# Patient Record
Sex: MALE | Race: WHITE | NOT HISPANIC OR LATINO | Employment: OTHER
[De-identification: names, ages, dates, MRNs, and addresses within clinical notes are randomized per-mention and may not be internally consistent; named-entity substitution may affect disease eponyms.]

---

## 2021-05-05 ENCOUNTER — TRANSCRIBE ORDERS (OUTPATIENT)
Dept: OTHER | Age: 66
End: 2021-05-05

## 2021-05-05 ENCOUNTER — DOCUMENTATION ONLY (OUTPATIENT)
Dept: ONCOLOGY | Facility: CLINIC | Age: 66
End: 2021-05-05

## 2021-05-05 DIAGNOSIS — C15.5 MALIGNANT NEOPLASM OF LOWER THIRD OF ESOPHAGUS (H): Primary | ICD-10-CM

## 2021-05-05 NOTE — PROGRESS NOTES
RECORDS STATUS - ALL OTHER DIAGNOSIS      RECORDS RECEIVED FROM: [facility]   DATE RECEIVED:    NOTES STATUS DETAILS   OFFICE NOTE from referring provider [facility] Dr. Jones   OFFICE NOTE from medical oncologist     DISCHARGE SUMMARY from hospital [facility] 21   DISCHARGE REPORT from the ER     OPERATIVE REPORT [facility] 21: EGD   MEDICATION LIST [facility]   CLINICAL TRIAL TREATMENTS TO DATE     LABS     PATHOLOGY REPORTS [facility], Report in CE, Slides requested   [employer] 5/3/21   ANYTHING RELATED TO DIAGNOSIS Epic/CE 21   GENONOMIC TESTING     TYPE:     IMAGING (NEED IMAGES & REPORT)     CT SCANS PACS 5/3/21, 21: [facility]   MRI     MAMMO     ULTRASOUND     PET

## 2021-05-07 DIAGNOSIS — C15.5 MALIGNANT NEOPLASM OF LOWER THIRD OF ESOPHAGUS (H): Primary | ICD-10-CM

## 2021-05-12 LAB — COPATH REPORT: NORMAL

## 2021-05-12 PROCEDURE — 999N001032 HC STATISTIC REVIEW OUTSIDE SLIDES TC 88321: Performed by: STUDENT IN AN ORGANIZED HEALTH CARE EDUCATION/TRAINING PROGRAM

## 2021-05-12 PROCEDURE — 88321 CONSLTJ&REPRT SLD PREP ELSWR: CPT | Performed by: PATHOLOGY

## 2021-05-21 ENCOUNTER — TELEPHONE (OUTPATIENT)
Dept: SURGERY | Facility: CLINIC | Age: 66
End: 2021-05-21

## 2021-05-21 NOTE — TELEPHONE ENCOUNTER
"Called patient on his home phone and cell phone 2x. His cell phone rang and rang then the call ended and his home phone is not in service. Reaching out to see if he as had the PET done and if he can get PFT done at [facility] near him.     I also called [facility] and left a voicemail on what the plan is for treatment and if patient is still needing an appointment with Thoracic Surgery.     Found in CE on 5/19/21:  \"Ever is here with his daughter Janice for oncology hospital follow up. Dr. Ram explained need for PET scan to complete staging and radiation consult. He plans to treat Ever with chemoradiation with taxol carbo via peripheral iv. Ever currently doesn't have insurance and requests everything be delayed until June 1st when his insurance coverage starts. Notified referral coordinator with request to contact daughter Janice to reschedule radiation consult for June 1st or later. Ever prefers appointments to be on odd days in June as these are his \"short\" work days. Counseled Ever regarding daily radiation treatments M-F and weekly chemotherapy and potential side effects. Ever works 7 days per week and has no one to cover for him. He is trying to find someone who can help him as he goes through treatment, otherwise Janice mentioned selling the company. Encouraged Ever to call in with new insurance ID numbers if he has them so we can check on pre authorization. Provided [facility] Esophageal Cancer patient guidebook and contact information. Encouraged Ever and Janice to call with any care coordination needs.     Rosa Menjivar RN GI/Head and Neck Cancer Coordinator, [phone]\"     "

## 2021-06-30 ENCOUNTER — TELEPHONE (OUTPATIENT)
Dept: SURGERY | Facility: CLINIC | Age: 66
End: 2021-06-30

## 2021-06-30 DIAGNOSIS — C15.5 MALIGNANT NEOPLASM OF LOWER THIRD OF ESOPHAGUS (H): Primary | ICD-10-CM

## 2021-06-30 NOTE — TELEPHONE ENCOUNTER
Called patients daughter to let her know that Ever will need PFT and then an appointment with Thoracic Surgery. Patient is needing a covid order faxed and then [facility] will schedule the covid test and PFT. Left a voicemail with my call back information.

## 2021-07-02 ENCOUNTER — TELEPHONE (OUTPATIENT)
Dept: SURGERY | Facility: CLINIC | Age: 66
End: 2021-07-02

## 2021-07-02 NOTE — TELEPHONE ENCOUNTER
Called Ever to let him know he is needing PFT done prior to seeing one of the Thoracic Surgeons. Left a voicemail with call back information.    Shalonda from [facility] called me letting me know they have been trying to call patient and have not heard from him either. She said they are booking out to the end of July and [facility] may be an option as well since he lives in [place]. P: [phone]

## 2021-07-27 ENCOUNTER — TRANSCRIBE ORDERS (OUTPATIENT)
Dept: OTHER | Age: 66
End: 2021-07-27

## 2021-07-27 DIAGNOSIS — C15.5 MALIGNANT NEOPLASM OF LOWER THIRD OF ESOPHAGUS (H): Primary | ICD-10-CM

## 2021-07-29 NOTE — TELEPHONE ENCOUNTER
RECORDS STATUS - ALL OTHER DIAGNOSIS      RECORDS RECEIVED FROM: [facility], Epic   DATE RECEIVED: 7/29   NOTES STATUS DETAILS   OFFICE NOTE from referring provider [facility] - Yo Choe MD   [facility]   DISCHARGE SUMMARY from hospital CE - Eduardo 5/2/21   DISCHARGE REPORT from the ER NA    OPERATIVE REPORT [facility] 5/4/21, 5/3/21: EGD   MEDICATION LIST [facility]   CLINICAL TRIAL TREATMENTS TO DATE     LABS     PATHOLOGY REPORTS [facility] 5/12/21: Path Consult ([facility])   ANYTHING RELATED TO DIAGNOSIS Epic/ 7/29/21   GENONOMIC TESTING     TYPE:     IMAGING (NEED IMAGES & REPORT)     [facility]   CT SCANS PACS 5/3/21, 4/23/21: [facility]   MRI     MAMMO     ULTRASOUND     PET PACS 6/7/21: [facility]

## 2021-08-10 ENCOUNTER — PRE VISIT (OUTPATIENT)
Dept: SURGERY | Facility: CLINIC | Age: 66
End: 2021-08-10

## 2021-08-16 NOTE — TELEPHONE ENCOUNTER
RECORDS STATUS - ALL OTHER DIAGNOSIS      RECORDS RECEIVED FROM: [facility]   DATE RECEIVED: 8/19/2021   NOTES STATUS DETAILS   OFFICE NOTE from referring provider Complete Epic   Yo Jones MD   OFFICE NOTE from medical oncologist Complete CE 7/12/2021 Office Visit- DX- Malignant Neoplasm of lower third of esophagus     More in CE   DISCHARGE SUMMARY from hospital N/A 5/2/2021 Dehydration    DISCHARGE REPORT from the ER     OPERATIVE REPORT Complete 5/4/2021 Esophagogastroduodenoscopy     See Path Consult Below    MEDICATION LIST Complete CE   CLINICAL TRIAL TREATMENTS TO DATE     LABS     PATHOLOGY REPORTS Complete 5/12/2021 Path Consult in [facility]   CASE FROM [facility], [place], [place] ([id number],   OBTAINED 5/3/21):     ESOPHAGUS, MASS, BIOPSIES:   - Invasive adenocarcinoma, 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

## 2021-08-18 NOTE — PROGRESS NOTES
THORACIC SURGERY - NEW PATIENT OFFICE VISIT      Dear Dr. Manjarrez,    I saw Mr. Rust at Dr. Jones s request in consultation for the evaluation and treatment of stage II (T3 N0 MX ; G3) distal esophageal adenocarcinoma.    HPI  Mr. Ever Rust is a 66 year old man with no significant past medical history who presents to the clinic today for evaluation of stage II (T3 N0 MX ; G3) poorly differentiated distal esophageal adenocarcinoma. Mass was found initially in April after patient presented with 1-2 months of worsening dysphagia and 60 pound weight loss. CT scans at that time found the mass and he was referred to Gastroenterology for EGD and biopsy with pathology confirmed findings. Esophageal stent also placed at that time.   Patient then established with Hematology and Oncology where he began neoadjuvant chemoXRT. Patient's presentation to [facility] Thoracic surgery was delayed secondary to a lack of insurance coverage.   At this time, he continues to experience severe side abdominal/thoracic pain, fullness, and discomfort preventing him from eating or sleeping. Occasional nauseous from pain, but no emesis. He is now down to 181 pounds from 276 when this started. He notes he works 365 days a year and about 16-18 hours a day as a  and his symptoms have severely impaired his ability to keep up with this work. He completed Chemo (5 cycles) and Radiation on Aug 5th.     Previsit Tests   CT Abd/Pelvis 4/23/21: Distal esophagus/proximal stomach Mass contributing to fluid distention of distal esophagus. Mildly enlarged left upper abdominal lymph nodes.    EGD 5/4/21: A large, fungating and ulcerating mass with no bleeding and no stigmata of recent bleeding was found in the lower third of the esophagus, 41 to 45 cm from the incisors. The mass was partially obstructing and circumferential. Pathology returned showing invasive poorly differentiated adenocarcinoma with areas of signet ring formation.  They placed a stent at the time for palliation.     6/7/21 PET-CT: increased activity associated thickening involving distal esophagus and stomach    Pulmonary Function Testing: Not yet performed    PMH  None - not seen a doctor since 1992.     PSH  None    PSochH  ETOH: None  TOB: Former smoker, 10 yhears PPD, Quit in 1981  Rec/Illicit drugs: None    From a personal perspective, lives in [place], about 150 miles away from [place]. His daughter and several other family members live nearby and assist him with ADLs. Daughter Janice was present on the video visit.      Physical examination  BMI N/a  Unable to perform 2/2 to video visit.       IMPRESSION   66 year old year-old male with stage II (T3 N0 MX ; G3) poorly differentiated distal esophageal adenocarcinoma.       PLAN  I spent 60 min on the date of the encounter in chart review, patient visit, review of tests, documentation and/or discussion with other providers about the issues documented above. I reviewed the plan as follows:    I explained to him that given the extent of the cancer into the stomach, he will likely need a total gastrectomy/esophagojejunostomy as opposed to an Henry-Deon procedure.  We discussed what the surgery would entail. He was quite overwhelmed. He wants to proceed with surgery if appropriate but is very concerned about losing time from work as it is his only source of income.  He also finds it hard to travel to the city and would like to keep this to a minimum.    - CXR locally and consult local endocopist to remove stent which appears migrated mostly into stomach ( we will reach out to his oncologist regarding this)  - Rpt PET in early sept  - nutirtion labs  - encouraged to drink 2-3 ensures a day  -  through local doctor to help with care  - based on PET and readiness for surgery will plan a staged procedure of 1) EGD, lap staging followed by 2) gastrectomy/ esophagojejunostomy  -     All questions were answered  and Ever Rust and present family were in agreement with the plan.  I appreciate the opportunity to participate in the care of your patient and will keep you updated.  Sincerely,

## 2021-08-19 ENCOUNTER — VIRTUAL VISIT (OUTPATIENT)
Dept: SURGERY | Facility: CLINIC | Age: 66
End: 2021-08-19
Attending: INTERNAL MEDICINE
Payer: COMMERCIAL

## 2021-08-19 ENCOUNTER — PRE VISIT (OUTPATIENT)
Dept: SURGERY | Facility: CLINIC | Age: 66
End: 2021-08-19

## 2021-08-19 DIAGNOSIS — C15.5 MALIGNANT NEOPLASM OF LOWER THIRD OF ESOPHAGUS (H): Primary | ICD-10-CM

## 2021-08-19 PROCEDURE — 99205 OFFICE O/P NEW HI 60 MIN: CPT | Mod: 95 | Performed by: STUDENT IN AN ORGANIZED HEALTH CARE EDUCATION/TRAINING PROGRAM

## 2021-08-19 RX ORDER — ONDANSETRON 4 MG/1
TABLET, ORALLY DISINTEGRATING ORAL
COMMUNITY
Start: 2021-04-26

## 2021-08-19 RX ORDER — OXYCODONE HYDROCHLORIDE 5 MG/1
TABLET ORAL
COMMUNITY
Start: 2021-08-04

## 2021-08-19 RX ORDER — PROCHLORPERAZINE MALEATE 10 MG
TABLET ORAL
COMMUNITY
Start: 2021-06-21

## 2021-08-19 RX ORDER — DEXAMETHASONE 4 MG/1
TABLET ORAL
COMMUNITY
Start: 2021-07-13

## 2021-08-19 NOTE — PROGRESS NOTES
Ever is a 66 year old who is being evaluated via a billable video visit.      How would you like to obtain your AVS? Mail a copy     If the video visit is dropped, the invitation should be resent by: Text to cell phone: [phone]     Will anyone else be joining your video visit? Stephanie Balbuena LPN    Video Start Time: 12:08 PM  Video-Visit Details    Type of service:  Video Visit    Video End Time:12:08 PM    Originating Location (pt. Location): Home    Distant Location (provider location):  [facility]     Platform used for Video Visit: Farm[employer]

## 2021-08-19 NOTE — LETTER
8/19/2021         RE: Ever Rust  [address] [facility] [address]        Dear Colleague,    Thank you for referring your patient, Ever Rust, to the [facility]. Please see a copy of my visit note below.    THORACIC SURGERY - NEW PATIENT OFFICE VISIT      Dear Dr. Manjarrez,    I saw Mr. Rust at Dr. Jones s request in consultation for the evaluation and treatment of stage II (T3 N0 MX ; G3) distal esophageal adenocarcinoma.    HPI  Mr. Ever Rust is a 66 year old man with no significant past medical history who presents to the clinic today for evaluation of stage II (T3 N0 MX ; G3) poorly differentiated distal esophageal adenocarcinoma. Mass was found initially in April after patient presented with 1-2 months of worsening dysphagia and 60 pound weight loss. CT scans at that time found the mass and he was referred to Gastroenterology for EGD and biopsy with pathology confirmed findings. Esophageal stent also placed at that time.   Patient then established with Hematology and Oncology where he began neoadjuvant chemoXRT. Patient's presentation to [facility] Thoracic surgery was delayed secondary to a lack of insurance coverage.   At this time, he continues to experience severe side abdominal/thoracic pain, fullness, and discomfort preventing him from eating or sleeping. Occasional nauseous from pain, but no emesis. He is now down to 181 pounds from 276 when this started. He notes he works 365 days a year and about 16-18 hours a day as a  and his symptoms have severely impaired his ability to keep up with this work. He completed Chemo (5 cycles) and Radiation on Aug 5th.     Previsit Tests   CT Abd/Pelvis 4/23/21: Distal esophagus/proximal stomach Mass contributing to fluid distention of distal esophagus. Mildly enlarged left upper abdominal lymph nodes.    EGD 5/4/21: A large, fungating and ulcerating mass with no bleeding and no stigmata of recent  bleeding was found in the lower third of the esophagus, 41 to 45 cm from the incisors. The mass was partially obstructing and circumferential. Pathology returned showing invasive poorly differentiated adenocarcinoma with areas of signet ring formation. They placed a stent at the time for palliation.     6/7/21 PET-CT: increased activity associated thickening involving distal esophagus and stomach    Pulmonary Function Testing: Not yet performed    PMH  None - not seen a doctor since 1992.     PSH  None    PSochH  ETOH: None  TOB: Former smoker, 10 yhears PPD, Quit in 1981  Rec/Illicit drugs: None    From a personal perspective, lives in [place], about 150 miles away from [place]. His daughter and several other family members live nearby and assist him with ADLs. Daughter Janice was present on the video visit.      Physical examination  BMI N/a  Unable to perform 2/2 to video visit.       IMPRESSION   66 year old year-old male with stage II (T3 N0 MX ; G3) poorly differentiated distal esophageal adenocarcinoma.       PLAN  I spent 60 min on the date of the encounter in chart review, patient visit, review of tests, documentation and/or discussion with other providers about the issues documented above. I reviewed the plan as follows:    I explained to him that given the extent of the cancer into the stomach, he will likely need a total gastrectomy/esophagojejunostomy as opposed to an Henry-Deon procedure.  We discussed what the surgery would entail. He was quite overwhelmed. He wants to proceed with surgery if appropriate but is very concerned about losing time from work as it is his only source of income.  He also finds it hard to travel to the city and would like to keep this to a minimum.    - CXR locally and consult local endocopist to remove stent which appears migrated mostly into stomach ( we will reach out to his oncologist regarding this)  - Rpt PET in early sept  - nutirtion labs  - encouraged to  drink 2-3 ensures a day  -  through local doctor to help with care  - based on PET and readiness for surgery will plan a staged procedure of 1) EGD, lap staging followed by 2) gastrectomy/ esophagojejunostomy  -     All questions were answered and Ever Rust and present family were in agreement with the plan.  I appreciate the opportunity to participate in the care of your patient and will keep you updated.  Sincerely,            Ever is a 66 year old who is being evaluated via a billable video visit.      How would you like to obtain your AVS? Mail a copy     If the video visit is dropped, the invitation should be resent by: Text to cell phone: [phone]     Will anyone else be joining your video visit? No         Uvaldo Balbuena LPN    Video Start Time: 12:08 PM  Video-Visit Details    Type of service:  Video Visit    Video End Time:12:08 PM    Originating Location (pt. Location): Home    Distant Location (provider location):  [facility]CER [facility]     Platform used for Video Visit: Well      Again, thank you for allowing me to participate in the care of your patient.        Sincerely,        Karely Evans MD

## 2021-08-20 DIAGNOSIS — T85.528A MIGRATION OF ESOPHAGEAL STENT, INITIAL ENCOUNTER: Primary | ICD-10-CM

## 2021-08-24 ENCOUNTER — TELEPHONE (OUTPATIENT)
Dept: SURGERY | Facility: CLINIC | Age: 66
End: 2021-08-24

## 2021-08-24 NOTE — TELEPHONE ENCOUNTER
Person calling: local [facility] RNCC Erika  Phone [phone] ext [phone]     Reason for call: clarify whether pt needs current stent removed, per OV note 8/19: based on PET and readiness for surgery will plan a staged procedure of 1) EGD, lap staging followed by 2) gastrectomy/ esophagojejunostomy    Action taken: routed this encounter to the following provider(s):  p thoracic care coordinator